# Patient Record
Sex: MALE | Race: WHITE | URBAN - METROPOLITAN AREA
[De-identification: names, ages, dates, MRNs, and addresses within clinical notes are randomized per-mention and may not be internally consistent; named-entity substitution may affect disease eponyms.]

---

## 2018-12-03 ENCOUNTER — TRANSFERRED RECORDS (OUTPATIENT)
Dept: HEALTH INFORMATION MANAGEMENT | Facility: CLINIC | Age: 68
End: 2018-12-03

## 2019-02-06 ENCOUNTER — TRANSFERRED RECORDS (OUTPATIENT)
Dept: HEALTH INFORMATION MANAGEMENT | Facility: CLINIC | Age: 69
End: 2019-02-06

## 2019-07-11 ENCOUNTER — TRANSFERRED RECORDS (OUTPATIENT)
Dept: HEALTH INFORMATION MANAGEMENT | Facility: CLINIC | Age: 69
End: 2019-07-11

## 2019-07-11 ENCOUNTER — MEDICAL CORRESPONDENCE (OUTPATIENT)
Dept: HEALTH INFORMATION MANAGEMENT | Facility: CLINIC | Age: 69
End: 2019-07-11

## 2019-09-09 DIAGNOSIS — M48.04 THORACIC SPINAL STENOSIS: Primary | ICD-10-CM

## 2019-09-09 DIAGNOSIS — M48.061 SPINAL STENOSIS, LUMBAR REGION, WITHOUT NEUROGENIC CLAUDICATION: ICD-10-CM

## 2019-09-18 ENCOUNTER — DOCUMENTATION ONLY (OUTPATIENT)
Dept: CARE COORDINATION | Facility: CLINIC | Age: 69
End: 2019-09-18

## 2019-09-24 ENCOUNTER — PATIENT OUTREACH (OUTPATIENT)
Dept: NEUROSURGERY | Facility: CLINIC | Age: 69
End: 2019-09-24

## 2019-09-25 ASSESSMENT — ENCOUNTER SYMPTOMS
EYE REDNESS: 0
NUMBNESS: 0
JOINT SWELLING: 1
TASTE DISTURBANCE: 0
DIZZINESS: 1
HOARSE VOICE: 0
MUSCLE WEAKNESS: 1
TROUBLE SWALLOWING: 1
MYALGIAS: 0
EYE PAIN: 0
EYE IRRITATION: 0
LOSS OF CONSCIOUSNESS: 0
HEADACHES: 0
SPEECH CHANGE: 0
PARALYSIS: 0
SORE THROAT: 0
ARTHRALGIAS: 1
DISTURBANCES IN COORDINATION: 0
EYE WATERING: 0
MUSCLE CRAMPS: 0
STIFFNESS: 1
SINUS CONGESTION: 0
SMELL DISTURBANCE: 0
TINGLING: 0
WEAKNESS: 1
SINUS PAIN: 0
TREMORS: 0
MEMORY LOSS: 0
NECK PAIN: 1
SEIZURES: 0
BACK PAIN: 1
NECK MASS: 0
DOUBLE VISION: 0

## 2019-09-26 ENCOUNTER — OFFICE VISIT (OUTPATIENT)
Dept: NEUROSURGERY | Facility: CLINIC | Age: 69
End: 2019-09-26
Payer: MEDICARE

## 2019-09-26 ENCOUNTER — ANCILLARY PROCEDURE (OUTPATIENT)
Dept: GENERAL RADIOLOGY | Facility: CLINIC | Age: 69
End: 2019-09-26
Attending: NEUROLOGICAL SURGERY
Payer: MEDICARE

## 2019-09-26 ENCOUNTER — ANCILLARY PROCEDURE (OUTPATIENT)
Dept: CT IMAGING | Facility: CLINIC | Age: 69
End: 2019-09-26
Attending: NEUROLOGICAL SURGERY
Payer: MEDICARE

## 2019-09-26 VITALS
HEART RATE: 96 BPM | OXYGEN SATURATION: 100 % | SYSTOLIC BLOOD PRESSURE: 139 MMHG | DIASTOLIC BLOOD PRESSURE: 82 MMHG | RESPIRATION RATE: 16 BRPM

## 2019-09-26 DIAGNOSIS — M48.061 SPINAL STENOSIS, LUMBAR REGION, WITHOUT NEUROGENIC CLAUDICATION: ICD-10-CM

## 2019-09-26 DIAGNOSIS — S22.081D: ICD-10-CM

## 2019-09-26 DIAGNOSIS — Z98.1 HISTORY OF THORACIC SPINAL FUSION: Primary | ICD-10-CM

## 2019-09-26 DIAGNOSIS — M48.04 THORACIC SPINAL STENOSIS: ICD-10-CM

## 2019-09-26 RX ORDER — GABAPENTIN 100 MG/1
CAPSULE ORAL PRN
COMMUNITY
Start: 2019-01-25

## 2019-09-26 RX ORDER — ATORVASTATIN CALCIUM 10 MG/1
TABLET, FILM COATED ORAL
COMMUNITY

## 2019-09-26 RX ORDER — LEVOTHYROXINE SODIUM 50 UG/1
50 TABLET ORAL
COMMUNITY
Start: 2019-03-20

## 2019-09-26 RX ORDER — PROPRANOLOL HYDROCHLORIDE 40 MG/1
40 TABLET ORAL PRN
COMMUNITY
Start: 2017-04-06

## 2019-09-26 RX ORDER — UBIDECARENONE 100 MG
100 CAPSULE ORAL
COMMUNITY

## 2019-09-26 RX ORDER — ROSUVASTATIN CALCIUM 40 MG/1
20 TABLET, COATED ORAL
COMMUNITY
Start: 2018-03-01

## 2019-09-26 RX ORDER — LOSARTAN POTASSIUM AND HYDROCHLOROTHIAZIDE 25; 100 MG/1; MG/1
1 TABLET ORAL
COMMUNITY
Start: 2018-02-15

## 2019-09-26 RX ORDER — BETAMETHASONE DIPROPIONATE 0.5 MG/G
LOTION TOPICAL PRN
COMMUNITY
Start: 2019-01-15

## 2019-09-26 RX ORDER — AMLODIPINE BESYLATE 5 MG/1
5 TABLET ORAL
COMMUNITY
Start: 2018-02-15

## 2019-09-26 ASSESSMENT — PAIN SCALES - GENERAL: PAINLEVEL: MILD PAIN (2)
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
## 2019-09-26 NOTE — NURSING NOTE
Chief Complaint   Patient presents with     New Patient     ump new multilevel spinal stenosis      Ami Hatch cma
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
## 2019-10-29 ENCOUNTER — CARE COORDINATION (OUTPATIENT)
Dept: NEUROSURGERY | Facility: CLINIC | Age: 69
End: 2019-10-29

## 2019-10-30 NOTE — PROGRESS NOTES
Writer received my chart message with patient question of imaging and 'an enlarged heart' patient seen in neurosurgery. Writer called patient and left a message that will send a my chart message as patient voice mail did not specifically state it was private.   Message sent via my chart. Writer routed message to provider RN.

## 2020-03-11 ENCOUNTER — HEALTH MAINTENANCE LETTER (OUTPATIENT)
Age: 70
End: 2020-03-11

## 2021-01-03 ENCOUNTER — HEALTH MAINTENANCE LETTER (OUTPATIENT)
Age: 71
End: 2021-01-03

## 2021-04-25 ENCOUNTER — HEALTH MAINTENANCE LETTER (OUTPATIENT)
Age: 71
End: 2021-04-25

## 2021-10-10 ENCOUNTER — HEALTH MAINTENANCE LETTER (OUTPATIENT)
Age: 71
End: 2021-10-10

## 2022-05-21 ENCOUNTER — HEALTH MAINTENANCE LETTER (OUTPATIENT)
Age: 72
End: 2022-05-21

## 2022-09-18 ENCOUNTER — HEALTH MAINTENANCE LETTER (OUTPATIENT)
Age: 72
End: 2022-09-18

## 2023-06-04 ENCOUNTER — HEALTH MAINTENANCE LETTER (OUTPATIENT)
Age: 73
End: 2023-06-04